# Patient Record
Sex: MALE | Race: BLACK OR AFRICAN AMERICAN | NOT HISPANIC OR LATINO | Employment: PART TIME | URBAN - METROPOLITAN AREA
[De-identification: names, ages, dates, MRNs, and addresses within clinical notes are randomized per-mention and may not be internally consistent; named-entity substitution may affect disease eponyms.]

---

## 2019-10-23 ENCOUNTER — HOSPITAL ENCOUNTER (OUTPATIENT)
Facility: HOSPITAL | Age: 34
Discharge: HOME | End: 2019-10-25
Attending: EMERGENCY MEDICINE | Admitting: EMERGENCY MEDICINE
Payer: MEDICAID

## 2019-10-23 DIAGNOSIS — K52.9 GASTROENTERITIS: Primary | ICD-10-CM

## 2019-10-23 DIAGNOSIS — K40.90 RIGHT INGUINAL HERNIA: ICD-10-CM

## 2019-10-23 LAB
ALBUMIN SERPL BCP-MCNC: 3.7 G/DL (ref 3.5–5.2)
ALP SERPL-CCNC: 98 U/L (ref 55–135)
ALT SERPL W/O P-5'-P-CCNC: 17 U/L (ref 10–44)
ANION GAP SERPL CALC-SCNC: 8 MMOL/L (ref 8–16)
AST SERPL-CCNC: 18 U/L (ref 10–40)
BASOPHILS # BLD AUTO: 0.01 K/UL (ref 0–0.2)
BASOPHILS NFR BLD: 0.2 % (ref 0–1.9)
BILIRUB SERPL-MCNC: 0.4 MG/DL (ref 0.1–1)
BUN SERPL-MCNC: 13 MG/DL (ref 6–20)
CALCIUM SERPL-MCNC: 9.2 MG/DL (ref 8.7–10.5)
CHLORIDE SERPL-SCNC: 106 MMOL/L (ref 95–110)
CO2 SERPL-SCNC: 24 MMOL/L (ref 23–29)
CREAT SERPL-MCNC: 1 MG/DL (ref 0.5–1.4)
DIFFERENTIAL METHOD: ABNORMAL
EOSINOPHIL # BLD AUTO: 0.2 K/UL (ref 0–0.5)
EOSINOPHIL NFR BLD: 4.5 % (ref 0–8)
ERYTHROCYTE [DISTWIDTH] IN BLOOD BY AUTOMATED COUNT: 13.2 % (ref 11.5–14.5)
EST. GFR  (AFRICAN AMERICAN): >60 ML/MIN/1.73 M^2
EST. GFR  (NON AFRICAN AMERICAN): >60 ML/MIN/1.73 M^2
GLUCOSE SERPL-MCNC: 81 MG/DL (ref 70–110)
HCT VFR BLD AUTO: 48.3 % (ref 40–54)
HGB BLD-MCNC: 15.8 G/DL (ref 14–18)
IMM GRANULOCYTES # BLD AUTO: 0.01 K/UL (ref 0–0.04)
IMM GRANULOCYTES NFR BLD AUTO: 0.2 % (ref 0–0.5)
LACTATE SERPL-SCNC: 1.6 MMOL/L (ref 0.5–2.2)
LIPASE SERPL-CCNC: 36 U/L (ref 4–60)
LYMPHOCYTES # BLD AUTO: 1.6 K/UL (ref 1–4.8)
LYMPHOCYTES NFR BLD: 35.9 % (ref 18–48)
MCH RBC QN AUTO: 27.9 PG (ref 27–31)
MCHC RBC AUTO-ENTMCNC: 32.7 G/DL (ref 32–36)
MCV RBC AUTO: 85 FL (ref 82–98)
MONOCYTES # BLD AUTO: 0.5 K/UL (ref 0.3–1)
MONOCYTES NFR BLD: 12 % (ref 4–15)
NEUTROPHILS # BLD AUTO: 2.1 K/UL (ref 1.8–7.7)
NEUTROPHILS NFR BLD: 47.2 % (ref 38–73)
NRBC BLD-RTO: 0 /100 WBC
PLATELET # BLD AUTO: 361 K/UL (ref 150–350)
PMV BLD AUTO: 9.1 FL (ref 9.2–12.9)
POTASSIUM SERPL-SCNC: 3.9 MMOL/L (ref 3.5–5.1)
PROT SERPL-MCNC: 7.8 G/DL (ref 6–8.4)
RBC # BLD AUTO: 5.66 M/UL (ref 4.6–6.2)
SODIUM SERPL-SCNC: 138 MMOL/L (ref 136–145)
WBC # BLD AUTO: 4.4 K/UL (ref 3.9–12.7)

## 2019-10-23 PROCEDURE — 96361 HYDRATE IV INFUSION ADD-ON: CPT

## 2019-10-23 PROCEDURE — 96374 THER/PROPH/DIAG INJ IV PUSH: CPT

## 2019-10-23 PROCEDURE — 80053 COMPREHEN METABOLIC PANEL: CPT

## 2019-10-23 PROCEDURE — 96375 TX/PRO/DX INJ NEW DRUG ADDON: CPT | Mod: 59

## 2019-10-23 PROCEDURE — 83690 ASSAY OF LIPASE: CPT

## 2019-10-23 PROCEDURE — 99285 EMERGENCY DEPT VISIT HI MDM: CPT | Mod: ,,, | Performed by: EMERGENCY MEDICINE

## 2019-10-23 PROCEDURE — 63600175 PHARM REV CODE 636 W HCPCS: Performed by: EMERGENCY MEDICINE

## 2019-10-23 PROCEDURE — 83605 ASSAY OF LACTIC ACID: CPT

## 2019-10-23 PROCEDURE — 99285 PR EMERGENCY DEPT VISIT,LEVEL V: ICD-10-PCS | Mod: ,,, | Performed by: EMERGENCY MEDICINE

## 2019-10-23 PROCEDURE — 85025 COMPLETE CBC W/AUTO DIFF WBC: CPT

## 2019-10-23 PROCEDURE — 99285 EMERGENCY DEPT VISIT HI MDM: CPT | Mod: 25

## 2019-10-23 RX ORDER — ONDANSETRON 2 MG/ML
8 INJECTION INTRAMUSCULAR; INTRAVENOUS
Status: COMPLETED | OUTPATIENT
Start: 2019-10-23 | End: 2019-10-23

## 2019-10-23 RX ADMIN — ONDANSETRON 8 MG: 2 INJECTION INTRAMUSCULAR; INTRAVENOUS at 10:10

## 2019-10-23 RX ADMIN — SODIUM CHLORIDE 1000 ML: 0.9 INJECTION, SOLUTION INTRAVENOUS at 10:10

## 2019-10-24 PROBLEM — R19.7 DIARRHEA: Status: ACTIVE | Noted: 2019-10-24

## 2019-10-24 PROBLEM — K40.90 RIGHT INGUINAL HERNIA: Status: ACTIVE | Noted: 2019-10-24

## 2019-10-24 PROBLEM — K52.9 GASTROENTERITIS: Status: ACTIVE | Noted: 2019-10-24

## 2019-10-24 LAB
ANION GAP SERPL CALC-SCNC: 6 MMOL/L (ref 8–16)
BASOPHILS # BLD AUTO: 0.01 K/UL (ref 0–0.2)
BASOPHILS NFR BLD: 0.2 % (ref 0–1.9)
BUN SERPL-MCNC: 9 MG/DL (ref 6–20)
C DIFF GDH STL QL: NEGATIVE
C DIFF TOX A+B STL QL IA: NEGATIVE
CALCIUM SERPL-MCNC: 9.2 MG/DL (ref 8.7–10.5)
CHLORIDE SERPL-SCNC: 106 MMOL/L (ref 95–110)
CO2 SERPL-SCNC: 26 MMOL/L (ref 23–29)
CREAT SERPL-MCNC: 1.1 MG/DL (ref 0.5–1.4)
CRP SERPL-MCNC: 9.5 MG/L (ref 0–8.2)
DIFFERENTIAL METHOD: ABNORMAL
EOSINOPHIL # BLD AUTO: 0.2 K/UL (ref 0–0.5)
EOSINOPHIL NFR BLD: 2.4 % (ref 0–8)
ERYTHROCYTE [DISTWIDTH] IN BLOOD BY AUTOMATED COUNT: 13.5 % (ref 11.5–14.5)
ERYTHROCYTE [SEDIMENTATION RATE] IN BLOOD BY WESTERGREN METHOD: 7 MM/HR (ref 0–23)
EST. GFR  (AFRICAN AMERICAN): >60 ML/MIN/1.73 M^2
EST. GFR  (NON AFRICAN AMERICAN): >60 ML/MIN/1.73 M^2
GLUCOSE SERPL-MCNC: 87 MG/DL (ref 70–110)
HCT VFR BLD AUTO: 47.1 % (ref 40–54)
HGB BLD-MCNC: 14.8 G/DL (ref 14–18)
IMM GRANULOCYTES # BLD AUTO: 0.01 K/UL (ref 0–0.04)
IMM GRANULOCYTES NFR BLD AUTO: 0.2 % (ref 0–0.5)
LYMPHOCYTES # BLD AUTO: 1.8 K/UL (ref 1–4.8)
LYMPHOCYTES NFR BLD: 29.1 % (ref 18–48)
MAGNESIUM SERPL-MCNC: 2 MG/DL (ref 1.6–2.6)
MCH RBC QN AUTO: 27.6 PG (ref 27–31)
MCHC RBC AUTO-ENTMCNC: 31.4 G/DL (ref 32–36)
MCV RBC AUTO: 88 FL (ref 82–98)
MONOCYTES # BLD AUTO: 0.6 K/UL (ref 0.3–1)
MONOCYTES NFR BLD: 10.2 % (ref 4–15)
NEUTROPHILS # BLD AUTO: 3.6 K/UL (ref 1.8–7.7)
NEUTROPHILS NFR BLD: 57.9 % (ref 38–73)
NRBC BLD-RTO: 0 /100 WBC
PHOSPHATE SERPL-MCNC: 3.1 MG/DL (ref 2.7–4.5)
PLATELET # BLD AUTO: 336 K/UL (ref 150–350)
PMV BLD AUTO: 8.9 FL (ref 9.2–12.9)
POTASSIUM SERPL-SCNC: 5 MMOL/L (ref 3.5–5.1)
RBC # BLD AUTO: 5.37 M/UL (ref 4.6–6.2)
SODIUM SERPL-SCNC: 138 MMOL/L (ref 136–145)
WBC # BLD AUTO: 6.18 K/UL (ref 3.9–12.7)

## 2019-10-24 PROCEDURE — 25500020 PHARM REV CODE 255: Performed by: EMERGENCY MEDICINE

## 2019-10-24 PROCEDURE — 89055 LEUKOCYTE ASSESSMENT FECAL: CPT

## 2019-10-24 PROCEDURE — 87425 ROTAVIRUS AG IA: CPT

## 2019-10-24 PROCEDURE — 87046 STOOL CULTR AEROBIC BACT EA: CPT

## 2019-10-24 PROCEDURE — 87338 HPYLORI STOOL AG IA: CPT

## 2019-10-24 PROCEDURE — 86140 C-REACTIVE PROTEIN: CPT

## 2019-10-24 PROCEDURE — 99220 PR INITIAL OBSERVATION CARE,LEVL III: ICD-10-PCS | Mod: ,,, | Performed by: PHYSICIAN ASSISTANT

## 2019-10-24 PROCEDURE — G0378 HOSPITAL OBSERVATION PER HR: HCPCS

## 2019-10-24 PROCEDURE — 87427 SHIGA-LIKE TOXIN AG IA: CPT | Mod: 59

## 2019-10-24 PROCEDURE — 83735 ASSAY OF MAGNESIUM: CPT

## 2019-10-24 PROCEDURE — 85025 COMPLETE CBC W/AUTO DIFF WBC: CPT

## 2019-10-24 PROCEDURE — 87045 FECES CULTURE AEROBIC BACT: CPT

## 2019-10-24 PROCEDURE — 87449 NOS EACH ORGANISM AG IA: CPT

## 2019-10-24 PROCEDURE — 63600175 PHARM REV CODE 636 W HCPCS: Performed by: EMERGENCY MEDICINE

## 2019-10-24 PROCEDURE — 82656 EL-1 FECAL QUAL/SEMIQ: CPT

## 2019-10-24 PROCEDURE — 82272 OCCULT BLD FECES 1-3 TESTS: CPT

## 2019-10-24 PROCEDURE — S0030 INJECTION, METRONIDAZOLE: HCPCS | Performed by: PHYSICIAN ASSISTANT

## 2019-10-24 PROCEDURE — 84100 ASSAY OF PHOSPHORUS: CPT

## 2019-10-24 PROCEDURE — 83993 ASSAY FOR CALPROTECTIN FECAL: CPT

## 2019-10-24 PROCEDURE — 25000003 PHARM REV CODE 250: Performed by: PHYSICIAN ASSISTANT

## 2019-10-24 PROCEDURE — 87209 SMEAR COMPLEX STAIN: CPT

## 2019-10-24 PROCEDURE — 89125 SPECIMEN FAT STAIN: CPT

## 2019-10-24 PROCEDURE — 80048 BASIC METABOLIC PNL TOTAL CA: CPT

## 2019-10-24 PROCEDURE — 87329 GIARDIA AG IA: CPT

## 2019-10-24 PROCEDURE — 94761 N-INVAS EAR/PLS OXIMETRY MLT: CPT

## 2019-10-24 PROCEDURE — 99220 PR INITIAL OBSERVATION CARE,LEVL III: CPT | Mod: ,,, | Performed by: PHYSICIAN ASSISTANT

## 2019-10-24 PROCEDURE — 36415 COLL VENOUS BLD VENIPUNCTURE: CPT

## 2019-10-24 PROCEDURE — 85652 RBC SED RATE AUTOMATED: CPT

## 2019-10-24 RX ORDER — GLUCAGON 1 MG
1 KIT INJECTION
Status: DISCONTINUED | OUTPATIENT
Start: 2019-10-24 | End: 2019-10-25 | Stop reason: HOSPADM

## 2019-10-24 RX ORDER — IBUPROFEN 200 MG
16 TABLET ORAL
Status: DISCONTINUED | OUTPATIENT
Start: 2019-10-24 | End: 2019-10-25 | Stop reason: HOSPADM

## 2019-10-24 RX ORDER — ONDANSETRON 8 MG/1
8 TABLET, ORALLY DISINTEGRATING ORAL EVERY 8 HOURS PRN
Status: DISCONTINUED | OUTPATIENT
Start: 2019-10-24 | End: 2019-10-25 | Stop reason: HOSPADM

## 2019-10-24 RX ORDER — BISMUTH SUBSALICYLATE 525 MG/30ML
30 LIQUID ORAL 4 TIMES DAILY
Status: DISCONTINUED | OUTPATIENT
Start: 2019-10-24 | End: 2019-10-25 | Stop reason: HOSPADM

## 2019-10-24 RX ORDER — RAMELTEON 8 MG/1
8 TABLET ORAL NIGHTLY PRN
Status: DISCONTINUED | OUTPATIENT
Start: 2019-10-24 | End: 2019-10-25 | Stop reason: HOSPADM

## 2019-10-24 RX ORDER — SODIUM CHLORIDE 0.9 % (FLUSH) 0.9 %
5 SYRINGE (ML) INJECTION
Status: DISCONTINUED | OUTPATIENT
Start: 2019-10-24 | End: 2019-10-25 | Stop reason: HOSPADM

## 2019-10-24 RX ORDER — METRONIDAZOLE 500 MG/100ML
500 INJECTION, SOLUTION INTRAVENOUS
Status: DISCONTINUED | OUTPATIENT
Start: 2019-10-24 | End: 2019-10-24

## 2019-10-24 RX ORDER — BISACODYL 10 MG
10 SUPPOSITORY, RECTAL RECTAL DAILY PRN
Status: DISCONTINUED | OUTPATIENT
Start: 2019-10-24 | End: 2019-10-25 | Stop reason: HOSPADM

## 2019-10-24 RX ORDER — SODIUM CHLORIDE 9 MG/ML
INJECTION, SOLUTION INTRAVENOUS CONTINUOUS
Status: DISCONTINUED | OUTPATIENT
Start: 2019-10-24 | End: 2019-10-24

## 2019-10-24 RX ORDER — ACETAMINOPHEN 325 MG/1
650 TABLET ORAL EVERY 4 HOURS PRN
Status: DISCONTINUED | OUTPATIENT
Start: 2019-10-24 | End: 2019-10-25 | Stop reason: HOSPADM

## 2019-10-24 RX ORDER — HYDROCODONE BITARTRATE AND ACETAMINOPHEN 5; 325 MG/1; MG/1
1 TABLET ORAL EVERY 6 HOURS PRN
Status: DISCONTINUED | OUTPATIENT
Start: 2019-10-24 | End: 2019-10-25 | Stop reason: HOSPADM

## 2019-10-24 RX ORDER — CIPROFLOXACIN 2 MG/ML
400 INJECTION, SOLUTION INTRAVENOUS
Status: DISCONTINUED | OUTPATIENT
Start: 2019-10-24 | End: 2019-10-24

## 2019-10-24 RX ORDER — IBUPROFEN 200 MG
24 TABLET ORAL
Status: DISCONTINUED | OUTPATIENT
Start: 2019-10-24 | End: 2019-10-25 | Stop reason: HOSPADM

## 2019-10-24 RX ORDER — IPRATROPIUM BROMIDE AND ALBUTEROL SULFATE 2.5; .5 MG/3ML; MG/3ML
3 SOLUTION RESPIRATORY (INHALATION) EVERY 4 HOURS PRN
Status: DISCONTINUED | OUTPATIENT
Start: 2019-10-24 | End: 2019-10-25 | Stop reason: HOSPADM

## 2019-10-24 RX ORDER — MORPHINE SULFATE 4 MG/ML
4 INJECTION, SOLUTION INTRAMUSCULAR; INTRAVENOUS
Status: COMPLETED | OUTPATIENT
Start: 2019-10-24 | End: 2019-10-24

## 2019-10-24 RX ADMIN — Medication 30 ML: at 05:10

## 2019-10-24 RX ADMIN — METRONIDAZOLE 500 MG: 500 INJECTION, SOLUTION INTRAVENOUS at 10:10

## 2019-10-24 RX ADMIN — MORPHINE SULFATE 4 MG: 4 INJECTION, SOLUTION INTRAMUSCULAR; INTRAVENOUS at 01:10

## 2019-10-24 RX ADMIN — Medication 30 ML: at 09:10

## 2019-10-24 RX ADMIN — HYDROCODONE BITARTRATE AND ACETAMINOPHEN 1 TABLET: 5; 325 TABLET ORAL at 09:10

## 2019-10-24 RX ADMIN — IOHEXOL 75 ML: 350 INJECTION, SOLUTION INTRAVENOUS at 12:10

## 2019-10-24 RX ADMIN — Medication 30 ML: at 01:10

## 2019-10-24 NOTE — PLAN OF CARE
Problem: Infection  Goal: Infection Symptom Resolution  Outcome: Ongoing, Progressing     Problem: Adult Inpatient Plan of Care  Goal: Optimal Comfort and Wellbeing  Outcome: Ongoing, Progressing

## 2019-10-24 NOTE — H&P
[facility] Hospital Medicine  History & Physical    Patient Name: Yoel Mccartney  MRN: [id number]  Admission Date: 10/23/2019  Attending Physician: AMEE Martínez MD   Primary Care Provider: Primary Doctor [facility] Medicine Team: Networked reference to record PCT  Gloria Mistry PA-C     Patient information was obtained from patient, past medical records and ER records.     Subjective:     Principal Problem:Gastroenteritis    Chief Complaint:   Chief Complaint   Patient presents with    Abdominal Pain     reports hx hernia, n/v x2 wks        HPI: Yoel Mccartney is a 34M with no past medical history who presents for lower abd discomfort associated with N/V and diarrhea x 2 days. He describes the pain as crampy/sharp in his lower abdomen. No alleviating or exacerbating factors. He reports about 3 episodes of emesis and loose/watery brown stools, about 4x/day. No fevers at home, no recent travel, no sick contacts. He has a RIH that has been present for a few years.    In ED: VSS, afebrile without leukocytosis. Lactate WNL. Lipase WNL. LFTs WNL. CT abd pelvis could not r/o SBO. Gen surg evaluates, do not feel bowel obstruction is source of pt symptoms. Likely infectious/inflammatory etiology.    History reviewed. No pertinent past medical history.    History reviewed. No pertinent surgical history.    Review of patient's allergies indicates:  No Known Allergies    No current facility-administered medications on file prior to encounter.      No current outpatient medications on file prior to encounter.     Family History     None        Tobacco Use    Smoking status: Current Every Day Smoker     Packs/day: 0.50     Types: Cigarettes    Smokeless tobacco: Never Used   Substance and Sexual Activity    Alcohol use: Never     Frequency: Never    Drug use: Not Currently     Types: Marijuana     Comment: Last use 4 years ago    Sexual activity: Not on file     Review of Systems   Constitutional:  Negative for chills, diaphoresis and fever.   Respiratory: Negative for cough, chest tightness and shortness of breath.    Cardiovascular: Negative for chest pain, palpitations and leg swelling.   Gastrointestinal: Positive for abdominal pain, diarrhea, nausea and vomiting. Negative for constipation.   Genitourinary: Negative for difficulty urinating, dysuria and hematuria.   Musculoskeletal: Negative for back pain and gait problem.   Skin: Negative for color change and wound.   Neurological: Negative for seizures, syncope, speech difficulty and weakness.   Psychiatric/Behavioral: Negative for agitation and confusion. The patient is not nervous/anxious.      Objective:     Vital Signs (Most Recent):  Temp: 97.9 °F (36.6 °C) (10/24/19 0956)  Pulse: 74 (10/24/19 0956)  Resp: 18 (10/24/19 0956)  BP: 119/75 (10/24/19 0956)  SpO2: 99 % (10/24/19 0956) Vital Signs (24h Range):  Temp:  [97.4 °F (36.3 °C)-98.3 °F (36.8 °C)] 97.9 °F (36.6 °C)  Pulse:  [72-97] 74  Resp:  [15-20] 18  SpO2:  [97 %-100 %] 99 %  BP: (110-141)/(71-91) 119/75     Weight: 78.8 kg (173 lb 11.6 oz)  Body mass index is 26.41 kg/m².    Physical Exam   Constitutional: He is oriented to person, place, and time. He appears well-developed and well-nourished. No distress.   HENT:   Head: Normocephalic and atraumatic.   Eyes: EOM are normal. Right eye exhibits no discharge. Left eye exhibits no discharge.   Neck: Normal range of motion. Neck supple.   Cardiovascular: Normal rate, regular rhythm and normal heart sounds.   Pulmonary/Chest: Effort normal. No respiratory distress.   Abdominal: Soft. Bowel sounds are normal. There is tenderness (mild lower regions). There is no guarding.   Musculoskeletal: Normal range of motion. He exhibits no edema or tenderness.   Neurological: He is alert and oriented to person, place, and time. No cranial nerve deficit.   Skin: Skin is warm and dry. He is not diaphoretic. No erythema. No pallor.   Psychiatric: He has a normal  mood and affect. His behavior is normal. Thought content normal.         CRANIAL NERVES     CN III, IV, VI   Extraocular motions are normal.        Significant Labs: All pertinent labs within the past 24 hours have been reviewed.    Significant Imaging: I have reviewed and interpreted all pertinent imaging results/findings within the past 24 hours.    Assessment/Plan:     * Gastroenteritis  Diarrhea  Right Inguinal Hernia    34M with no significant PMHx presenting for abd discomfort with N/V/D x 2days.   - afebrile without leukocytosis  - LFTS and Lipase WNL  - lactic acid WNL  - ESR WNL, CRP ordered  - CT abd/plevis potential enteritis and small bowel obstruction  - gen surg evaluated, do not believe SBO present; more likely infectious/inflammatory; RIH easily reducible, can follow up outpt for further mgmt  - stool studies ordered  - will hold off on abx for now given afebrile without leukocytosis, normal ESR  - CLD  - monitor for improvement     VTE Risk Mitigation (From admission, onward)         Ordered     IP VTE LOW RISK PATIENT  Once      10/24/19 0720     Place OTIS hose  Until discontinued      10/24/19 0720     Place sequential compression device  Until discontinued      10/24/19 0720                 Discussed with staff  Gloria Mistry PA-C  Department of Hospital Medicine   [facility]

## 2019-10-24 NOTE — PROGRESS NOTES
"   10/24/19 0601   Vital Signs   Temp 97.4 °F (36.3 °C)   Temp src Oral   Pulse 72   Resp 18   SpO2 98 %   /71   Height and Weight   Height 5' 8" (1.727 m)   Height Method Stated   Weight 78.8 kg (173 lb 11.6 oz)   Weight Method Bed Scale   BSA (Calculated - sq m) 1.94 sq meters   BMI (Calculated) 26.5   Weight in (lb) to have BMI = 25 164.1   Assessments (Pre/Post)   Level of Consciousness (AVPU) alert     Pt received to unit with all of belongings. Pt c/o mid abdominal pain that goes down to groin. RIH palpable and is reducible. IV to left hand. Pt states pain and nausea is tolerable, no interventions at this time. VSS. Pt Aox4, oriented to room and unit. Bed in lowest position, bed wheels locked, call bell within reach. Pt requesting jello or something soft to eat, no diet orders at this time. ALISSA Brown made aware, no new orders at this time. Stimuli decreased and lighting adjusted.  Will continue to monitor pt.   "

## 2019-10-24 NOTE — ASSESSMENT & PLAN NOTE
Diarrhea  Right Inguinal Hernia    34M with no significant PMHx presenting for abd discomfort with N/V/D x 2days.   - afebrile without leukocytosis  - LFTS and Lipase WNL  - lactic acid WNL  - ESR WNL, CRP ordered  - CT abd/plevis potential enteritis and small bowel obstruction  - gen surg evaluated, do not believe SBO present; more likely infectious/inflammatory; RIH easily reducible, can follow up outpt for further mgmt  - stool studies ordered: unremarkable thus far, no c. diff  - will hold off on abx for now given afebrile without leukocytosis, normal ESR, CRP mildly elevated  - CLD  - pt reports improvement in symptoms with supportive care and bowel rest. Tolerating PO. Stable for d/c with PCP follow up return precautions discussed

## 2019-10-24 NOTE — ED PROVIDER NOTES
Encounter Date: 10/23/2019    SCRIBE #1 NOTE: I, Violeta Ramirez, am scribing for, and in the presence of,  Dr. Foster. I have scribed the following portions of the note - Other sections scribed: HPI, ROS, PE.       History     Chief Complaint   Patient presents with    Abdominal Pain     reports hx hernia, n/v x2 wks     HPI:    Yoel Mccartney is a 34 y.o. male with no significant past medical history who presents to the ED complaining of worsening abdominal pain for 1 week. Pain is located to upper abdomen. Over the past two days reports decreased appetite, bloating of abdomen, vomiting, and diarrhea. Reports watery stool with some blood over the last couple days. Reports 3-4 episodes of emesis today. He is still nauseated currently and feels very uncomfortable.     He states he self diagnosed himself with an inguinal hernia, but denies ever seeing a doctor for the hernia. Denies pain at hernia. Denies fever, chest pain, shortness of breath, recent travel, positive sick contacts, penile pain, or testicular pain.    PCP is Dr. Sp Ramirez at [facility].    The history is provided by the patient and medical records. No  was used.     Review of patient's allergies indicates:  No Known Allergies  History reviewed. No pertinent past medical history.  History reviewed. No pertinent surgical history.  History reviewed. No pertinent family history.  Social History     Tobacco Use    Smoking status: Current Every Day Smoker     Packs/day: 0.50     Types: Cigarettes    Smokeless tobacco: Never Used   Substance Use Topics    Alcohol use: Never     Frequency: Never    Drug use: Not Currently     Types: Marijuana     Comment: Last use 4 years ago     Review of Systems  Constitutional:  No Fever, No Chills, Positive for appetite change  Eyes: No Vision Changes  ENT/Mouth: No sore throat, No rhinorrhea  Cardiovascular:  No Chest Pain, No Palpitations  Respiratory:  No Cough, No SOB  Gastrointestinal:   Positive for Nausea, Positive for Vomiting, Positive for Diarrhea, Positive for blood in stool, Positive for abdo pain and bloating  Genitourinary:  No  pain, No dysuria   Musculoskeletal:  No Arthralgias, No Back Pain, No Neck Pain, No recent trauma.  Skin:  No skin Lesions  Neuro:  No Weakness, No Numbness, No Paresthesias, No Dizziness, No Headache    Physical Exam     Initial Vitals [10/23/19 2016]   BP Pulse Resp Temp SpO2   132/84 88 16 97.6 °F (36.4 °C) 100 %      MAP       --         Physical Exam    Nursing note and vitals reviewed.    Physical Exam:  CONSTITUTIONAL: Well developed, well nourished, in no acute distress.  HENT: Normocephalic, atraumatic    EYES: Sclerae anicteric   NECK: Supple, no thyroid enlargement  CARDIOVASCULAR: Regular rate and rhythm without any murmurs, gallops, rubs.  RESPIRATORY: Speaking in full sentences. Breathing comfortably. Auscultation of the lungs revealed normal breath sounds b/l, no wheezing, no rales, no rhonchi.  ABDOMEN: Reducible right inguinal hernia with no overlying skin changes. No tenderness to hernia. Soft and nontender, no masses, no rebound or guarding.  NEUROLOGIC: Alert, interacting normally. No facial droop.   MSK: Moving all four extremities.  Skin: Warm and dry. No visible rash on exposed areas of skin.    Psych: Mood and affect normal.       ED Course   Procedures  Labs Reviewed   CBC W/ AUTO DIFFERENTIAL - Abnormal; Notable for the following components:       Result Value    Platelets 361 (*)     MPV 9.1 (*)     All other components within normal limits   COMPREHENSIVE METABOLIC PANEL   LIPASE   LACTIC ACID, PLASMA          Imaging Results    None          Medical Decision Making:   History:   Old Medical Records: I decided to obtain old medical records.  Clinical Tests:   Lab Tests: Ordered and Reviewed    Initial Assessment: 33 y/o male with right inguinal hernia coming in with several episodes of emesis and diarrhea with mildly blood streaked  "stool. No perfecto blood. Stool is not black in color.  Abdomen is benign with no signs of obstructions or signs of incarceration or strangulation. Will undertake work up with labs including lactate. Will control symptoms with nausea medications and IV fluids. Small bowel obstruction considered however at this time unlikely due to continued BM and benign abdominal. If symptoms do not improve will consider imaging for bowel obstruction.      Upate: Labs noted, benign.  Upon re-evaluation, after IVF/anti-emetics, pt reports feeling "terrible". Abdominal exam, soft, mild diffuse TTP, given this, will obtain CT to look for SBO.    CT show concern for possible partial SBO. Gen surg consulted, they do not believe consisent with SBO, likely infectious per gen surg resident.    Given additional round of pain medication and will PO trial. If cannot tolerate PO, will need to stay in hospital for further monitoring. If tolerating PO, can have trial of outpatient therapy. Signed out to Dr. De Dios.     MDM Complexity Points:   Problem Points:  1.New problem, with no additional ED work-up planned (maximum of 1) - Abdominal pain    Data Points:  Review or order clinical lab tests, Review or order radiology test, Decision to obtain old records (in the EHR) and Discuss test with performing physician/consulting physician - discussed with consulting surgical team.     Risk:  High Risk         Scribe Attestation:   Scribe #1: I performed the above scribed service and the documentation accurately describes the services I performed. I attest to the accuracy of the note.    Attending Attestation:           Physician Attestation for Scribe:  Physician Attestation Statement for Scribe #1: I, Dr. Foster, reviewed documentation, as scribed by Violeta Ramirez in my presence, and it is both accurate and complete.                    Clinical Impression:   No diagnosis found.                               Yosvany Foster MD  10/24/19 5810    "

## 2019-10-24 NOTE — HPI
Yoel Mccartney is a 34M with no past medical history who presents for lower abd discomfort associated with N/V and diarrhea x 2 days. He describes the pain as crampy/sharp in his lower abdomen. No alleviating or exacerbating factors. He reports about 3 episodes of emesis and loose/watery brown stools, about 4x/day. No fevers at home, no recent travel, no sick contacts. He has a RIH that has been present for a few years.    In ED: VSS, afebrile without leukocytosis. Lactate WNL. Lipase WNL. LFTs WNL. CT abd pelvis could not r/o SBO. Gen surg evaluates, do not feel bowel obstruction is source of pt symptoms. Likely infectious/inflammatory etiology.

## 2019-10-24 NOTE — ED NOTES
Patient identifiers for Yoel Mccartney checked and correct.  LOC: The patient is awake, alert and aware of environment with an appropriate affect, the patient is oriented x 3 and speaking appropriately.  APPEARANCE: Patient resting comfortably and in no acute distress, patient is clean and well groomed, patient's clothing are properly fastened.  SKIN: The skin is warm and dry, patient has age appropriate skin turgor and moist mucus membranes, skin intact, no breakdown or brusing noted.  MUSKULOSKELETAL: Patient moving all extremities well, no obvious swelling or deformities noted.  RESPIRATORY: Airway is open and patent, respirations are spontaneous, patient has a normal effort and rate, no accessory muscle use noted.  Clear breath sounds bilaterally.  CARDIAC: Patient has a normal rate and rhythm, no periphreal edema noted, capillary refill < 3 seconds.  ABDOMEN: Soft and non tender to palpation, no distention noted.  Normoactive bowel sounds x4.  NEUROLOGIC: PERRL, facial expression is symmetrical, bilateral hand grasp equal and even, normal sensation in all extremities when touched with a finger.

## 2019-10-24 NOTE — ASSESSMENT & PLAN NOTE
Diarrhea  Right Inguinal Hernia    34M with no significant PMHx presenting for abd discomfort with N/V/D x 2days.   - afebrile without leukocytosis  - LFTS and Lipase WNL  - lactic acid WNL  - ESR WNL, CRP ordered  - CT abd/plevis potential enteritis and small bowel obstruction  - gen surg evaluated, do not believe SBO present; more likely infectious/inflammatory; RIH easily reducible, can follow up outpt for further mgmt  - stool studies ordered  - IV cipro/flagyl  - CLD  - monitor for improvement

## 2019-10-24 NOTE — ED TRIAGE NOTES
Yoel Mccartney, a 34 y.o. male presents to the ED w/ complaint of abdominal fullness, discomfort, NVD x2 days. Previous hernia    Triage note:  Chief Complaint   Patient presents with    Abdominal Pain     reports hx hernia, n/v x2 wks     Review of patient's allergies indicates:  No Known Allergies  No past medical history on file.     Pt identifiers Yoel Mccartney checked and correct    APPEARANCE: Pt resting comfortably, in no acute distress, pt is clean and well groomed, clothing properly fastened  SKIN: Skin warm, dry and intact, normal skin turgor, moist mucus membranes  RESPIRATORY: Airway is open and patent, respirations are spontaneous, even and unlabored  CARDIAC:no peripheral edema noted, capillary refill < 3 seconds, bilateral radial pulses 2+  ABDOMEN: Soft, tender to RLQ and mid epigastric

## 2019-10-24 NOTE — SUBJECTIVE & OBJECTIVE
History reviewed. No pertinent past medical history.    History reviewed. No pertinent surgical history.    Review of patient's allergies indicates:  No Known Allergies    No current facility-administered medications on file prior to encounter.      No current outpatient medications on file prior to encounter.     Family History     None        Tobacco Use    Smoking status: Current Every Day Smoker     Packs/day: 0.50     Types: Cigarettes    Smokeless tobacco: Never Used   Substance and Sexual Activity    Alcohol use: Never     Frequency: Never    Drug use: Not Currently     Types: Marijuana     Comment: Last use 4 years ago    Sexual activity: Not on file     Review of Systems   Constitutional: Negative for chills, diaphoresis and fever.   Respiratory: Negative for cough, chest tightness and shortness of breath.    Cardiovascular: Negative for chest pain, palpitations and leg swelling.   Gastrointestinal: Positive for abdominal pain, diarrhea, nausea and vomiting. Negative for constipation.   Genitourinary: Negative for difficulty urinating, dysuria and hematuria.   Musculoskeletal: Negative for back pain and gait problem.   Skin: Negative for color change and wound.   Neurological: Negative for seizures, syncope, speech difficulty and weakness.   Psychiatric/Behavioral: Negative for agitation and confusion. The patient is not nervous/anxious.      Objective:     Vital Signs (Most Recent):  Temp: 97.9 °F (36.6 °C) (10/24/19 0956)  Pulse: 74 (10/24/19 0956)  Resp: 18 (10/24/19 0956)  BP: 119/75 (10/24/19 0956)  SpO2: 99 % (10/24/19 0956) Vital Signs (24h Range):  Temp:  [97.4 °F (36.3 °C)-98.3 °F (36.8 °C)] 97.9 °F (36.6 °C)  Pulse:  [72-97] 74  Resp:  [15-20] 18  SpO2:  [97 %-100 %] 99 %  BP: (110-141)/(71-91) 119/75     Weight: 78.8 kg (173 lb 11.6 oz)  Body mass index is 26.41 kg/m².    Physical Exam   Constitutional: He is oriented to person, place, and time. He appears well-developed and well-nourished.  No distress.   HENT:   Head: Normocephalic and atraumatic.   Eyes: EOM are normal. Right eye exhibits no discharge. Left eye exhibits no discharge.   Neck: Normal range of motion. Neck supple.   Cardiovascular: Normal rate, regular rhythm and normal heart sounds.   Pulmonary/Chest: Effort normal. No respiratory distress.   Abdominal: Soft. Bowel sounds are normal. There is tenderness (mild lower regions). There is no guarding.   Musculoskeletal: Normal range of motion. He exhibits no edema or tenderness.   Neurological: He is alert and oriented to person, place, and time. No cranial nerve deficit.   Skin: Skin is warm and dry. He is not diaphoretic. No erythema. No pallor.   Psychiatric: He has a normal mood and affect. His behavior is normal. Thought content normal.         CRANIAL NERVES     CN III, IV, VI   Extraocular motions are normal.        Significant Labs: All pertinent labs within the past 24 hours have been reviewed.    Significant Imaging: I have reviewed and interpreted all pertinent imaging results/findings within the past 24 hours.

## 2019-10-24 NOTE — ASSESSMENT & PLAN NOTE
Do not feel that a bowel obstruction is source of patients symptoms  Likely infectious/inflammatory due to diarrhea, large amount of air/liquid stool in colon, and lack of transition point  Discussed RIH with patient, would like to have it electively repaired in near future  Can see as outpatient  Please call with questions

## 2019-10-24 NOTE — SUBJECTIVE & OBJECTIVE
No current facility-administered medications on file prior to encounter.      No current outpatient medications on file prior to encounter.       Review of patient's allergies indicates:  No Known Allergies    History reviewed. No pertinent past medical history.  History reviewed. No pertinent surgical history.  Family History     None        Tobacco Use    Smoking status: Current Every Day Smoker     Packs/day: 0.50     Types: Cigarettes    Smokeless tobacco: Never Used   Substance and Sexual Activity    Alcohol use: Never     Frequency: Never    Drug use: Not Currently     Types: Marijuana     Comment: Last use 4 years ago    Sexual activity: Not on file     Review of Systems   Constitutional: Positive for appetite change and chills. Negative for fever.   HENT: Negative for congestion.    Respiratory: Negative for shortness of breath.    Cardiovascular: Negative for chest pain and palpitations.   Gastrointestinal: Positive for abdominal pain, diarrhea, nausea and vomiting.   Genitourinary: Negative for dysuria.   Musculoskeletal: Negative for back pain.   Skin: Negative for rash.   Neurological: Negative for syncope and weakness.   Psychiatric/Behavioral: Negative for agitation and confusion.     Objective:     Vital Signs (Most Recent):  Temp: 98.3 °F (36.8 °C) (10/24/19 0117)  Pulse: 97 (10/24/19 0117)  Resp: 20 (10/24/19 0117)  BP: 130/83 (10/24/19 0117)  SpO2: 99 % (10/24/19 0117) Vital Signs (24h Range):  Temp:  [97.6 °F (36.4 °C)-98.3 °F (36.8 °C)] 98.3 °F (36.8 °C)  Pulse:  [81-97] 97  Resp:  [16-20] 20  SpO2:  [98 %-100 %] 99 %  BP: (130-141)/(83-91) 130/83     Weight: 72.6 kg (160 lb)  Body mass index is 24.33 kg/m².    Physical Exam   Constitutional: He is oriented to person, place, and time. He appears well-developed and well-nourished. No distress.   HENT:   Head: Normocephalic and atraumatic.   Eyes: Pupils are equal, round, and reactive to light. EOM are normal.   Neck: Normal range of motion.  Neck supple.   Cardiovascular: Normal rate and intact distal pulses.   Pulmonary/Chest: Effort normal. No respiratory distress.   Abdominal: Soft. He exhibits no distension. There is tenderness (mild epigastric). A hernia (small, easily reducible RIH) is present.   Musculoskeletal: He exhibits no edema.   Neurological: He is alert and oriented to person, place, and time.   Skin: Skin is warm and dry.   Psychiatric: He has a normal mood and affect.       Significant Labs:  CBC:   Recent Labs   Lab 10/23/19  2207   WBC 4.40   RBC 5.66   HGB 15.8   HCT 48.3   *   MCV 85   MCH 27.9   MCHC 32.7     CMP:   Recent Labs   Lab 10/23/19  2207   GLU 81   CALCIUM 9.2   ALBUMIN 3.7   PROT 7.8      K 3.9   CO2 24      BUN 13   CREATININE 1.0   ALKPHOS 98   ALT 17   AST 18   BILITOT 0.4       Significant Diagnostics:  CT reviewed personally with radiology.  Markedly dilated stomach.  Diffusely dilated small and large bowel.  Liquid stool and air throughout colon and rectum.  Some mid jejunum thickening but dilated distal bowel.  No bowel in RIH.  No transition point seen

## 2019-10-24 NOTE — CONSULTS
[facility]  General Surgery  Consult Note    Patient Name: Yoel cMcartney  MRN: [id number]  Code Status: No Order  Admission Date: 10/23/2019  Hospital Length of Stay: 0 days  Attending Physician: Yosvany Foster MD  Primary Care Provider: No primary care provider on file.    Patient information was obtained from patient and ER records.     Inpatient consult to General Surgery  Consult performed by: Favio Norris MD  Consult ordered by: Yosvany Foster MD        Subjective:     Principal Problem: <principal problem not specified>    History of Present Illness: Yoel Mccartney is a 34 y.o. M with no past medical/surgical hx who presents to the ED with a 3-4 day history of nausea, vomiting, bloating and diarrhea. He also endorses some crampy upper abdominal pain.   His last meal was yesterday morning.  He last had diarrhea prior to coming to the hospital.  He endorses a lot of belching and flatulence over the past 3 days.  The cramping worsened yesterday so he came to the ED for further evaluation.  He denies any recent travel or sick contacts.  In the ED, his labs were unremarkable.  CT abdomen pelvis shows diffusely dilated bowel from the stomach to the rectum.  Per rads read, could not rule out pSBO.  He has no past surgical history.  Has had a small RIH for a few years which has always been easily reducible.  Surgery was consulted for evaluation.       No current facility-administered medications on file prior to encounter.      No current outpatient medications on file prior to encounter.       Review of patient's allergies indicates:  No Known Allergies    History reviewed. No pertinent past medical history.  History reviewed. No pertinent surgical history.  Family History     None        Tobacco Use    Smoking status: Current Every Day Smoker     Packs/day: 0.50     Types: Cigarettes    Smokeless tobacco: Never Used   Substance and Sexual Activity    Alcohol use: Never     Frequency:  Never    Drug use: Not Currently     Types: Marijuana     Comment: Last use 4 years ago    Sexual activity: Not on file     Review of Systems   Constitutional: Positive for appetite change and chills. Negative for fever.   HENT: Negative for congestion.    Respiratory: Negative for shortness of breath.    Cardiovascular: Negative for chest pain and palpitations.   Gastrointestinal: Positive for abdominal pain, diarrhea, nausea and vomiting.   Genitourinary: Negative for dysuria.   Musculoskeletal: Negative for back pain.   Skin: Negative for rash.   Neurological: Negative for syncope and weakness.   Psychiatric/Behavioral: Negative for agitation and confusion.     Objective:     Vital Signs (Most Recent):  Temp: 98.3 °F (36.8 °C) (10/24/19 0117)  Pulse: 97 (10/24/19 0117)  Resp: 20 (10/24/19 0117)  BP: 130/83 (10/24/19 0117)  SpO2: 99 % (10/24/19 0117) Vital Signs (24h Range):  Temp:  [97.6 °F (36.4 °C)-98.3 °F (36.8 °C)] 98.3 °F (36.8 °C)  Pulse:  [81-97] 97  Resp:  [16-20] 20  SpO2:  [98 %-100 %] 99 %  BP: (130-141)/(83-91) 130/83     Weight: 72.6 kg (160 lb)  Body mass index is 24.33 kg/m².    Physical Exam   Constitutional: He is oriented to person, place, and time. He appears well-developed and well-nourished. No distress.   HENT:   Head: Normocephalic and atraumatic.   Eyes: Pupils are equal, round, and reactive to light. EOM are normal.   Neck: Normal range of motion. Neck supple.   Cardiovascular: Normal rate and intact distal pulses.   Pulmonary/Chest: Effort normal. No respiratory distress.   Abdominal: Soft. He exhibits no distension. There is tenderness (mild epigastric). A hernia (small, easily reducible RIH) is present.   Musculoskeletal: He exhibits no edema.   Neurological: He is alert and oriented to person, place, and time.   Skin: Skin is warm and dry.   Psychiatric: He has a normal mood and affect.       Significant Labs:  CBC:   Recent Labs   Lab 10/23/19  2207   WBC 4.40   RBC 5.66   HGB 15.8    HCT 48.3   *   MCV 85   MCH 27.9   MCHC 32.7     CMP:   Recent Labs   Lab 10/23/19  2207   GLU 81   CALCIUM 9.2   ALBUMIN 3.7   PROT 7.8      K 3.9   CO2 24      BUN 13   CREATININE 1.0   ALKPHOS 98   ALT 17   AST 18   BILITOT 0.4       Significant Diagnostics:  CT reviewed personally with radiology.  Markedly dilated stomach.  Diffusely dilated small and large bowel.  Liquid stool and air throughout colon and rectum.  Some mid jejunum thickening but dilated distal bowel.  No bowel in RIH.  No transition point seen    Assessment/Plan:     Diarrhea  Do not feel that a bowel obstruction is source of patients symptoms  Likely infectious/inflammatory due to diarrhea, large amount of air/liquid stool in colon, and lack of transition point  Discussed RIH with patient, would like to have it electively repaired in near future  Can see as outpatient  Please call with questions       VTE Risk Mitigation (From admission, onward)    None          Thank you for your consult. I will sign off. Please contact us if you have any additional questions.    Favio Norris MD  General Surgery  [facility]

## 2019-10-24 NOTE — HPI
Yoel Mccartney is a 34 y.o. M with no past medical/surgical hx who presents to the ED with a 3-4 day history of nausea, vomiting, bloating and diarrhea. He also endorses some crampy upper abdominal pain.   His last meal was yesterday morning.  He last had diarrhea prior to coming to the hospital.  He endorses a lot of belching and flatulence over the past 3 days.  The cramping worsened yesterday so he came to the ED for further evaluation.  He denies any recent travel or sick contacts.  In the ED, his labs were unremarkable.  CT abdomen pelvis shows diffusely dilated bowel from the stomach to the rectum.  Per rads read, could not rule out pSBO.  He has no past surgical history.  Has had a small RIH for a few years which has always been easily reducible.  Surgery was consulted for evaluation.

## 2019-10-24 NOTE — PLAN OF CARE
10/24/19 1138   Discharge Assessment   Assessment Type Discharge Planning Assessment   Confirmed/corrected address and phone number on facesheet? Yes   Assessment information obtained from? Patient   Expected Length of Stay (days)   (2)   Communicated expected length of stay with patient/caregiver yes   Prior to hospitilization cognitive status: Alert/Oriented   Prior to hospitalization functional status: Independent   Current cognitive status: Alert/Oriented   Current Functional Status: Independent   Facility Arrived From: Home   Lives With other (see comments)  (Lives with Step-mother and her friend)   Able to Return to Prior Arrangements yes   Is patient able to care for self after discharge? Yes   Who are your caregiver(s) and their phone number(s)?   (Shakira Costa (Step-Mother) [phone])   Patient's perception of discharge disposition home or selfcare   Readmission Within the Last 30 Days no previous admission in last 30 days   Patient currently being followed by outpatient case management? No   Patient currently receives any other outside agency services? No   Equipment Currently Used at Home none   Do you have any problems affording any of your prescribed medications? No   Is the patient taking medications as prescribed? yes   Does the patient have transportation home? Yes   Transportation Anticipated family or friend will provide   Does the patient receive services at the [facility]? No   Discharge Plan A Home with family   Discharge Plan B Home with family   DME Needed Upon Discharge  none   Patient/Family in Agreement with Plan yes   Does the patient have transportation to healthcare appointments? Yes

## 2019-10-25 ENCOUNTER — TELEPHONE (OUTPATIENT)
Dept: SURGERY | Facility: CLINIC | Age: 34
End: 2019-10-25

## 2019-10-25 VITALS
BODY MASS INDEX: 26.33 KG/M2 | TEMPERATURE: 98 F | OXYGEN SATURATION: 96 % | WEIGHT: 173.75 LBS | RESPIRATION RATE: 18 BRPM | SYSTOLIC BLOOD PRESSURE: 110 MMHG | HEART RATE: 75 BPM | HEIGHT: 68 IN | DIASTOLIC BLOOD PRESSURE: 57 MMHG

## 2019-10-25 LAB
ANION GAP SERPL CALC-SCNC: 5 MMOL/L (ref 8–16)
BASOPHILS # BLD AUTO: 0.01 K/UL (ref 0–0.2)
BASOPHILS NFR BLD: 0.2 % (ref 0–1.9)
BUN SERPL-MCNC: 5 MG/DL (ref 6–20)
CALCIUM SERPL-MCNC: 9.6 MG/DL (ref 8.7–10.5)
CHLORIDE SERPL-SCNC: 104 MMOL/L (ref 95–110)
CO2 SERPL-SCNC: 28 MMOL/L (ref 23–29)
CREAT SERPL-MCNC: 1 MG/DL (ref 0.5–1.4)
CRYPTOSP AG STL QL IA: NEGATIVE
DIFFERENTIAL METHOD: ABNORMAL
EOSINOPHIL # BLD AUTO: 0.2 K/UL (ref 0–0.5)
EOSINOPHIL NFR BLD: 3.5 % (ref 0–8)
ERYTHROCYTE [DISTWIDTH] IN BLOOD BY AUTOMATED COUNT: 13.6 % (ref 11.5–14.5)
EST. GFR  (AFRICAN AMERICAN): >60 ML/MIN/1.73 M^2
EST. GFR  (NON AFRICAN AMERICAN): >60 ML/MIN/1.73 M^2
G LAMBLIA AG STL QL IA: NEGATIVE
GLUCOSE SERPL-MCNC: 81 MG/DL (ref 70–110)
HCT VFR BLD AUTO: 51.7 % (ref 40–54)
HGB BLD-MCNC: 16.2 G/DL (ref 14–18)
IMM GRANULOCYTES # BLD AUTO: 0.01 K/UL (ref 0–0.04)
IMM GRANULOCYTES NFR BLD AUTO: 0.2 % (ref 0–0.5)
LYMPHOCYTES # BLD AUTO: 2.2 K/UL (ref 1–4.8)
LYMPHOCYTES NFR BLD: 51.2 % (ref 18–48)
MAGNESIUM SERPL-MCNC: 1.9 MG/DL (ref 1.6–2.6)
MCH RBC QN AUTO: 28 PG (ref 27–31)
MCHC RBC AUTO-ENTMCNC: 31.3 G/DL (ref 32–36)
MCV RBC AUTO: 89 FL (ref 82–98)
MONOCYTES # BLD AUTO: 0.5 K/UL (ref 0.3–1)
MONOCYTES NFR BLD: 11.4 % (ref 4–15)
NEUTROPHILS # BLD AUTO: 1.4 K/UL (ref 1.8–7.7)
NEUTROPHILS NFR BLD: 33.5 % (ref 38–73)
NRBC BLD-RTO: 0 /100 WBC
OB PNL STL: POSITIVE
PHOSPHATE SERPL-MCNC: 3 MG/DL (ref 2.7–4.5)
PLATELET # BLD AUTO: 333 K/UL (ref 150–350)
PMV BLD AUTO: 9.6 FL (ref 9.2–12.9)
POTASSIUM SERPL-SCNC: 4.3 MMOL/L (ref 3.5–5.1)
RBC # BLD AUTO: 5.79 M/UL (ref 4.6–6.2)
RV AG STL QL IA.RAPID: NEGATIVE
SODIUM SERPL-SCNC: 137 MMOL/L (ref 136–145)
WBC # BLD AUTO: 4.28 K/UL (ref 3.9–12.7)
WBC #/AREA STL HPF: NORMAL /[HPF]

## 2019-10-25 PROCEDURE — 80048 BASIC METABOLIC PNL TOTAL CA: CPT

## 2019-10-25 PROCEDURE — 84100 ASSAY OF PHOSPHORUS: CPT

## 2019-10-25 PROCEDURE — 85025 COMPLETE CBC W/AUTO DIFF WBC: CPT

## 2019-10-25 PROCEDURE — G0378 HOSPITAL OBSERVATION PER HR: HCPCS

## 2019-10-25 PROCEDURE — 99226 PR SUBSEQUENT OBSERVATION CARE,LEVEL III: ICD-10-PCS | Mod: ,,, | Performed by: PHYSICIAN ASSISTANT

## 2019-10-25 PROCEDURE — 99226 PR SUBSEQUENT OBSERVATION CARE,LEVEL III: CPT | Mod: ,,, | Performed by: PHYSICIAN ASSISTANT

## 2019-10-25 PROCEDURE — 36415 COLL VENOUS BLD VENIPUNCTURE: CPT

## 2019-10-25 PROCEDURE — 83735 ASSAY OF MAGNESIUM: CPT

## 2019-10-25 PROCEDURE — 25000003 PHARM REV CODE 250: Performed by: PHYSICIAN ASSISTANT

## 2019-10-25 RX ORDER — BISMUTH SUBSALICYLATE 525 MG/30ML
30 LIQUID ORAL 4 TIMES DAILY
Refills: 0 | COMMUNITY
Start: 2019-10-25 | End: 2019-10-28

## 2019-10-25 RX ADMIN — Medication 30 ML: at 09:10

## 2019-10-25 NOTE — HOSPITAL COURSE
Patient admitted for gastroenteritis. Afebrile without leukocytosis during admission. Lactate, Lipase, LFTs WNL. CT abd pelvis could not r/o small bowel obstruction and also showed potential enteritis. Gen surg evaluated, do not feel bowel obstruction is source of symptoms, more likely infectious vs inflammatory. ESR WNL, CRP slightly elevated. Stool studies unremarkable thus far. Symptoms improved with supportive care and bowel rest. Tolerating PO prior to d/c. Stable for d/c with PCP follow up and gen surg referral for [facility] management.

## 2019-10-25 NOTE — DISCHARGE SUMMARY
[facility] Hospital Medicine  Discharge Summary      Patient Name: Yoel Mccartney  MRN: [id number]  Admission Date: 10/23/2019  Hospital Length of Stay: 0 days  Discharge Date and Time: 10/25/2019  1:40 PM  Attending Physician: Rach att. providers found   Discharging Provider: Gloria Mistry PA-C  Primary Care Provider: Primary Doctor Rach  [facility] Medicine Team: [facility] [place] Gloria Mistry PA-C    HPI:   Yoel Mccartney is a 34M with no past medical history who presents for lower abd discomfort associated with N/V and diarrhea x 2 days. He describes the pain as crampy/sharp in his lower abdomen. No alleviating or exacerbating factors. He reports about 3 episodes of emesis and loose/watery brown stools, about 4x/day. No fevers at home, no recent travel, no sick contacts. He has a RIH that has been present for a few years.    In ED: VSS, afebrile without leukocytosis. Lactate WNL. Lipase WNL. LFTs WNL. CT abd pelvis could not r/o SBO. Gen surg evaluates, do not feel bowel obstruction is source of pt symptoms. Likely infectious/inflammatory etiology.    * No surgery found *      Hospital Course:   Patient admitted for gastroenteritis. Afebrile without leukocytosis during admission. Lactate, Lipase, LFTs WNL. CT abd pelvis could not r/o small bowel obstruction and also showed potential enteritis. Gen surg evaluated, do not feel bowel obstruction is source of symptoms, more likely infectious vs inflammatory. ESR WNL, CRP slightly elevated. Stool studies unremarkable thus far. Symptoms improved with supportive care and bowel rest. Tolerating PO prior to d/c. Stable for d/c with PCP follow up and gen surg referral for RIH management.      Consults:   Consults (From admission, onward)        Status Ordering Provider     Inpatient consult to General Surgery  Once     Provider:  (Not yet assigned)    Completed OLLIE BEE          * Gastroenteritis  Diarrhea  Right Inguinal Hernia    34M with no  significant PMHx presenting for abd discomfort with N/V/D x 2days.   - afebrile without leukocytosis  - LFTS and Lipase WNL  - lactic acid WNL  - ESR WNL, CRP ordered  - CT abd/plevis potential enteritis and small bowel obstruction  - gen surg evaluated, do not believe SBO present; more likely infectious/inflammatory; RIH easily reducible, can follow up outpt for further mgmt  - stool studies ordered: unremarkable thus far, no c. diff  - will hold off on abx for now given afebrile without leukocytosis, normal ESR, CRP mildly elevated  - CLD  - pt reports improvement in symptoms with supportive care and bowel rest. Tolerating PO. Stable for d/c with PCP follow up return precautions discussed      Final Active Diagnoses:    Diagnosis Date Noted POA    PRINCIPAL PROBLEM:  Gastroenteritis [K52.9] 10/24/2019 Yes    Diarrhea [R19.7] 10/24/2019 Yes    Right inguinal hernia [K40.90] 10/24/2019 Yes      Problems Resolved During this Admission:       Discharged Condition: stable    Disposition: Home or Self Care    Follow Up:  Follow-up Information     Donte lyman.    Why:  Hospital follow-up scheduled for 11/6/19 @ 10AM. Please arrive 30 mins prior to appointment. Bring picture ID, Insurance care, medication list and Hospital discharge summary.   Contact information:  [address] [place] [address] [phone]             [facility] - General [facility].    Specialty:  Surgery  Why:  clinic nurse to call patient to schedule appointment to discuss Hernia repair  Contact information:  [address]  [facility] [address] [phone]  Additional information:  [facility], 2nd Floor               Patient Instructions:      Ambulatory Referral to General Surgery   Referral Priority: Routine Referral Type: Consultation   Referral Reason: Specialty Services Required   Requested Specialty: General Surgery   Number of Visits Requested: 1     Notify your health care  provider if you experience any of the following:  temperature >100.4     Notify your health care provider if you experience any of the following:  redness, tenderness, or signs of infection (pain, swelling, redness, odor or green/yellow discharge around incision site)     Notify your health care provider if you experience any of the following:  worsening rash     Notify your health care provider if you experience any of the following:  persistent nausea and vomiting or diarrhea     Notify your health care provider if you experience any of the following:  severe uncontrolled pain     Activity as tolerated       Significant Diagnostic Studies: Labs:   CBC   Recent Labs   Lab 10/23/19  2207 10/24/19  0931 10/25/19  0530   WBC 4.40 6.18 4.28   HGB 15.8 14.8 16.2   HCT 48.3 47.1 51.7   * 336 333     Microbiology: C. Diff negative    Pending Diagnostic Studies:     Procedure Component Value Units Date/Time    Calprotectin [[id number]] Collected:  10/24/19 1715    Order Status:  Sent Lab Status:  In process Updated:  10/24/19 1906    Specimen:  Stool     Fecal fat, qualitative [[id number]] Collected:  10/24/19 1715    Order Status:  Sent Lab Status:  In process Updated:  10/24/19 1906    Specimen:  Stool     H. pylori antigen, stool [[id number]] Collected:  10/24/19 1715    Order Status:  Sent Lab Status:  In process Updated:  10/24/19 1907    Specimen:  Stool     Pancreatic elastase, fecal [[id number]] Collected:  10/24/19 1715    Order Status:  Sent Lab Status:  In process Updated:  10/24/19 1906    Specimen:  Stool     Stool Exam-Ova,Cysts,Parasites [[id number]] Collected:  10/24/19 1715    Order Status:  Sent Lab Status:  In process Updated:  10/24/19 1905    Specimen:  Stool          Medications:  Reconciled Home Medications:      Medication List      START taking these medications    bismuth subsalicylate 262 mg/15 mL suspension  Commonly known as:  PEPTO BISMOL  Take 30 mLs by mouth 4 (four) times daily. for 3  days            Indwelling Lines/Drains at time of discharge:   Lines/Drains/Airways     None                 Time spent on the discharge of patient: >35 minutes  Patient was seen and examined on the date of discharge and determined to be suitable for discharge.       Discussed with staff  Gloria Mistry PA-C  Department of Hospital Medicine  [facility]

## 2019-10-25 NOTE — TELEPHONE ENCOUNTER
----- Message from Brianna Espinoza sent at 10/25/2019 10:58 AM CDT -----  Contact: discharge nurse  Please call above patient at [phone] being referred for hernia repair waiting on a call back being discharged today thanks.

## 2019-10-25 NOTE — PLAN OF CARE
Problem: Diarrhea  Goal: Fluid and Electrolyte Balance  Outcome: Ongoing, Progressing       Problem: Adult Inpatient Plan of Care  Goal: Plan of Care Review  Outcome: Ongoing, Progressing     Pt continues to have diarrhea. Abdominal pain still present and tenderness noted. PRN pain medication administered as ordered, pt states relief after administration. Pt denies nausea. Tolerating clear liquid diet. VSS. Afebrile. Safety rounding in progress. Bed in lowest position, bed wheels locked, call bell within reach. Pt instructed to ask for assistance when needed, pt complaint with call bell use.

## 2019-10-25 NOTE — PLAN OF CARE
CM received call from patient to discuss discharge time. Patient's order has been placed and follow-up appointments made. Patient reports family must drive from [facility] which is about an hour away. Patient instructed that family can leave now to head to hospital to pick patient up.

## 2019-10-26 LAB
E COLI SXT1 STL QL IA: NEGATIVE
E COLI SXT2 STL QL IA: NEGATIVE

## 2019-10-28 LAB
BACTERIA STL CULT: NORMAL
O+P STL MICRO: NORMAL

## 2019-10-29 LAB — FAT STL SUDAN IV STN: NORMAL

## 2019-10-29 NOTE — PLAN OF CARE
Patient discharged home to care of self on 10/25/19.     10/29/19 0733   Final Note   Assessment Type Final Discharge Note   Anticipated Discharge Disposition Home   What phone number can be called within the next 1-3 days to see how you are doing after discharge?   ([phone])   Hospital Follow Up  Appt(s) scheduled? Yes   Discharge plans and expectations educations in teach back method with documentation complete? Yes   Right Care Referral Info   Post Acute Recommendation No Care

## 2019-10-31 LAB — H PYLORI AG STL QL IA: NOT DETECTED

## 2019-11-01 LAB
CALPROTECTIN STL-MCNT: <15.6 MCG/G
ELASTASE 1, FECAL: <15 MCG/G

## 2021-07-15 ENCOUNTER — HOSPITAL ENCOUNTER (EMERGENCY)
Facility: OTHER | Age: 36
Discharge: HOME | End: 2021-07-15
Attending: EMERGENCY MEDICINE
Payer: MEDICAID

## 2021-07-15 VITALS
HEART RATE: 79 BPM | OXYGEN SATURATION: 100 % | BODY MASS INDEX: 26.33 KG/M2 | SYSTOLIC BLOOD PRESSURE: 109 MMHG | WEIGHT: 173.75 LBS | TEMPERATURE: 99 F | HEIGHT: 68 IN | DIASTOLIC BLOOD PRESSURE: 61 MMHG | RESPIRATION RATE: 12 BRPM

## 2021-07-15 DIAGNOSIS — R76.8 HEPATITIS C ANTIBODY TEST POSITIVE: ICD-10-CM

## 2021-07-15 DIAGNOSIS — K40.90 RIGHT INGUINAL HERNIA: Primary | ICD-10-CM

## 2021-07-15 LAB
ANION GAP SERPL CALC-SCNC: 8 MMOL/L (ref 8–16)
BASOPHILS # BLD AUTO: 0.05 K/UL (ref 0–0.2)
BASOPHILS NFR BLD: 0.3 % (ref 0–1.9)
BUN SERPL-MCNC: 14 MG/DL (ref 6–20)
CALCIUM SERPL-MCNC: 9 MG/DL (ref 8.7–10.5)
CHLORIDE SERPL-SCNC: 103 MMOL/L (ref 95–110)
CO2 SERPL-SCNC: 26 MMOL/L (ref 23–29)
CREAT SERPL-MCNC: 1 MG/DL (ref 0.5–1.4)
DIFFERENTIAL METHOD: ABNORMAL
EOSINOPHIL # BLD AUTO: 0.2 K/UL (ref 0–0.5)
EOSINOPHIL NFR BLD: 1.1 % (ref 0–8)
ERYTHROCYTE [DISTWIDTH] IN BLOOD BY AUTOMATED COUNT: 13.3 % (ref 11.5–14.5)
EST. GFR  (AFRICAN AMERICAN): >60 ML/MIN/1.73 M^2
EST. GFR  (NON AFRICAN AMERICAN): >60 ML/MIN/1.73 M^2
GLUCOSE SERPL-MCNC: 120 MG/DL (ref 70–110)
HCT VFR BLD AUTO: 40.7 % (ref 40–54)
HCV AB SERPL QL IA: POSITIVE
HGB BLD-MCNC: 13 G/DL (ref 14–18)
HIV 1+2 AB+HIV1 P24 AG SERPL QL IA: NEGATIVE
IMM GRANULOCYTES # BLD AUTO: 0.07 K/UL (ref 0–0.04)
IMM GRANULOCYTES NFR BLD AUTO: 0.4 % (ref 0–0.5)
LACTATE SERPL-SCNC: 0.9 MMOL/L (ref 0.5–2.2)
LYMPHOCYTES # BLD AUTO: 2.6 K/UL (ref 1–4.8)
LYMPHOCYTES NFR BLD: 13.9 % (ref 18–48)
MCH RBC QN AUTO: 27.5 PG (ref 27–31)
MCHC RBC AUTO-ENTMCNC: 31.9 G/DL (ref 32–36)
MCV RBC AUTO: 86 FL (ref 82–98)
MONOCYTES # BLD AUTO: 1.8 K/UL (ref 0.3–1)
MONOCYTES NFR BLD: 9.5 % (ref 4–15)
NEUTROPHILS # BLD AUTO: 13.8 K/UL (ref 1.8–7.7)
NEUTROPHILS NFR BLD: 74.8 % (ref 38–73)
NRBC BLD-RTO: 0 /100 WBC
PLATELET # BLD AUTO: 296 K/UL (ref 150–450)
PMV BLD AUTO: 8.9 FL (ref 9.2–12.9)
POTASSIUM SERPL-SCNC: 3.4 MMOL/L (ref 3.5–5.1)
RBC # BLD AUTO: 4.73 M/UL (ref 4.6–6.2)
SODIUM SERPL-SCNC: 137 MMOL/L (ref 136–145)
WBC # BLD AUTO: 18.41 K/UL (ref 3.9–12.7)

## 2021-07-15 PROCEDURE — 87389 HIV-1 AG W/HIV-1&-2 AB AG IA: CPT | Performed by: EMERGENCY MEDICINE

## 2021-07-15 PROCEDURE — 99285 EMERGENCY DEPT VISIT HI MDM: CPT | Mod: 25

## 2021-07-15 PROCEDURE — 86803 HEPATITIS C AB TEST: CPT | Performed by: EMERGENCY MEDICINE

## 2021-07-15 PROCEDURE — 36415 COLL VENOUS BLD VENIPUNCTURE: CPT | Performed by: EMERGENCY MEDICINE

## 2021-07-15 PROCEDURE — 83605 ASSAY OF LACTIC ACID: CPT | Performed by: EMERGENCY MEDICINE

## 2021-07-15 PROCEDURE — 63600175 PHARM REV CODE 636 W HCPCS: Performed by: EMERGENCY MEDICINE

## 2021-07-15 PROCEDURE — 25500020 PHARM REV CODE 255: Performed by: EMERGENCY MEDICINE

## 2021-07-15 PROCEDURE — 96374 THER/PROPH/DIAG INJ IV PUSH: CPT

## 2021-07-15 PROCEDURE — 85025 COMPLETE CBC W/AUTO DIFF WBC: CPT | Performed by: EMERGENCY MEDICINE

## 2021-07-15 PROCEDURE — 80048 BASIC METABOLIC PNL TOTAL CA: CPT | Performed by: EMERGENCY MEDICINE

## 2021-07-15 PROCEDURE — 87522 HEPATITIS C REVRS TRNSCRPJ: CPT | Performed by: EMERGENCY MEDICINE

## 2021-07-15 RX ORDER — FENTANYL CITRATE 50 UG/ML
50 INJECTION, SOLUTION INTRAMUSCULAR; INTRAVENOUS
Status: COMPLETED | OUTPATIENT
Start: 2021-07-15 | End: 2021-07-15

## 2021-07-15 RX ADMIN — IOHEXOL 100 ML: 350 INJECTION, SOLUTION INTRAVENOUS at 05:07

## 2021-07-15 RX ADMIN — FENTANYL CITRATE 50 MCG: 50 INJECTION, SOLUTION INTRAMUSCULAR; INTRAVENOUS at 06:07

## 2021-07-16 ENCOUNTER — TELEPHONE (OUTPATIENT)
Dept: EMERGENCY MEDICINE | Facility: OTHER | Age: 36
End: 2021-07-16

## 2021-07-16 LAB — HEPATITIS C VIRUS (HCV) RNA DETECTION/QUANTIFICATION RT-PCR: NORMAL IU/ML

## 2021-07-19 ENCOUNTER — TELEPHONE (OUTPATIENT)
Dept: EMERGENCY MEDICINE | Facility: OTHER | Age: 36
End: 2021-07-19